# Patient Record
Sex: FEMALE | Race: WHITE | NOT HISPANIC OR LATINO | URBAN - METROPOLITAN AREA
[De-identification: names, ages, dates, MRNs, and addresses within clinical notes are randomized per-mention and may not be internally consistent; named-entity substitution may affect disease eponyms.]

---

## 2022-10-12 ENCOUNTER — OFFICE VISIT (OUTPATIENT)
Dept: URBAN - METROPOLITAN AREA CLINIC 96 | Facility: CLINIC | Age: 67
End: 2022-10-12

## 2022-10-12 NOTE — HPI-TODAY'S VISIT:
67-year-old female previously remotely seen in clinic 2/6/2015 for screening colonoscopy which was performed 2/12/2015.  Examination unremarkable to the terminal ileum aside from internal hemorrhoids.  Repeat colonoscopy recommended in 10 years for screening if no signs, symptoms of concern, or change in family history with regards to risk factors.  Repeat colonoscopy therefore was recommended 2025. Patient now presents for

## 2022-11-16 ENCOUNTER — DASHBOARD ENCOUNTERS (OUTPATIENT)
Age: 67
End: 2022-11-16

## 2022-11-16 ENCOUNTER — OFFICE VISIT (OUTPATIENT)
Dept: URBAN - METROPOLITAN AREA CLINIC 96 | Facility: CLINIC | Age: 67
End: 2022-11-16
Payer: MEDICARE

## 2022-11-16 ENCOUNTER — WEB ENCOUNTER (OUTPATIENT)
Dept: URBAN - METROPOLITAN AREA CLINIC 96 | Facility: CLINIC | Age: 67
End: 2022-11-16

## 2022-11-16 VITALS
OXYGEN SATURATION: 95 % | DIASTOLIC BLOOD PRESSURE: 96 MMHG | HEIGHT: 68 IN | SYSTOLIC BLOOD PRESSURE: 163 MMHG | BODY MASS INDEX: 27.43 KG/M2 | WEIGHT: 181 LBS | HEART RATE: 87 BPM | TEMPERATURE: 98.9 F

## 2022-11-16 DIAGNOSIS — K62.5 RECTAL BLEEDING: ICD-10-CM

## 2022-11-16 DIAGNOSIS — Z85.3 PERSONAL HISTORY OF BREAST CANCER: ICD-10-CM

## 2022-11-16 DIAGNOSIS — R19.4 CHANGE IN BOWEL HABITS: ICD-10-CM

## 2022-11-16 DIAGNOSIS — Z80.0 FAMILY HISTORY OF COLON CANCER: ICD-10-CM

## 2022-11-16 DIAGNOSIS — K59.00 CONSTIPATION, UNSPECIFIED CONSTIPATION TYPE: ICD-10-CM

## 2022-11-16 PROBLEM — 14760008: Status: ACTIVE | Noted: 2022-11-16

## 2022-11-16 PROCEDURE — 99204 OFFICE O/P NEW MOD 45 MIN: CPT | Performed by: INTERNAL MEDICINE

## 2022-11-16 RX ORDER — GABAPENTIN 100 MG/1
1 CAPSULE CAPSULE ORAL ONCE A DAY
Status: ACTIVE | COMMUNITY

## 2022-11-16 RX ORDER — DICLOFENAC POTASSIUM 50 MG/1
1 TABLET WITH FOOD OR MILK AS NEEDED TABLET, FILM COATED ORAL TWICE A DAY
Status: ACTIVE | COMMUNITY

## 2022-11-16 RX ORDER — POLYETHYLENE GLYCOL 3350, SODIUM CHLORIDE, SODIUM BICARBONATE, POTASSIUM CHLORIDE 420; 11.2; 5.72; 1.48 G/4L; G/4L; G/4L; G/4L
AS DIRECTED POWDER, FOR SOLUTION ORAL
OUTPATIENT
Start: 2022-11-16

## 2022-11-16 NOTE — HPI-TODAY'S VISIT:
67-year-old female previously remotely seen for screening colonoscopy performed 2/12/2015.  Unremarkable to the terminal ileum aside from internal hemorrhoids documented on retroflexion.  Repeat colonoscopy for screening purposes recommended in 10 years if no signs, symptoms of concern, change in family history with regards to risk factors.  At her prior 2015 visit, she reported vague history of "possible polyp".  Patient now reports constipation. Patient reports has been having issues at the anal area with "blockage sensation". Ongoing for the past year. No rectal pain. Thinner stools and "balls". Has tried Metamucil, no prior Miralax.   Rectal bleeding intermittent for 6 months, BRBPR with no rectal pain. No unintentional weight loss.   UTD with primary MD. Breast cancer 2020, surgery with no additional therapy required.  BM every 3 days as per baseline.

## 2022-11-16 NOTE — PHYSICAL EXAM HENT:
Head, normocephalic, atraumatic, Face, Face within normal limits, Ears, External ears within normal limits, Neck anterior scar

## 2022-12-13 PROBLEM — 312824007: Status: ACTIVE | Noted: 2022-12-13

## 2022-12-13 PROBLEM — 12063002: Status: ACTIVE | Noted: 2022-12-13

## 2022-12-13 PROBLEM — 129851009: Status: ACTIVE | Noted: 2022-12-13

## 2022-12-13 PROBLEM — 429087003: Status: ACTIVE | Noted: 2022-11-16

## 2023-01-09 ENCOUNTER — TELEPHONE ENCOUNTER (OUTPATIENT)
Dept: URBAN - METROPOLITAN AREA CLINIC 96 | Facility: CLINIC | Age: 68
End: 2023-01-09

## 2023-01-13 ENCOUNTER — CLAIMS CREATED FROM THE CLAIM WINDOW (OUTPATIENT)
Dept: URBAN - METROPOLITAN AREA SURGERY CENTER 18 | Facility: SURGERY CENTER | Age: 68
End: 2023-01-13

## 2023-01-13 ENCOUNTER — CLAIMS CREATED FROM THE CLAIM WINDOW (OUTPATIENT)
Dept: URBAN - METROPOLITAN AREA SURGERY CENTER 18 | Facility: SURGERY CENTER | Age: 68
End: 2023-01-13
Payer: MEDICARE

## 2023-01-13 ENCOUNTER — CLAIMS CREATED FROM THE CLAIM WINDOW (OUTPATIENT)
Dept: URBAN - METROPOLITAN AREA CLINIC 4 | Facility: CLINIC | Age: 68
End: 2023-01-13
Payer: MEDICARE

## 2023-01-13 DIAGNOSIS — K59.09 CHANGE IN BOWEL MOVEMENTS INTERMITTENT CONSTIPATION. URGENCY IN THE MORNING.: ICD-10-CM

## 2023-01-13 DIAGNOSIS — D12.2 ADENOMA OF ASCENDING COLON: ICD-10-CM

## 2023-01-13 DIAGNOSIS — K62.5 ANAL BLEEDING: ICD-10-CM

## 2023-01-13 DIAGNOSIS — K63.5 BENIGN COLON POLYP: ICD-10-CM

## 2023-01-13 DIAGNOSIS — D12.2 BENIGN NEOPLASM OF ASCENDING COLON: ICD-10-CM

## 2023-01-13 PROCEDURE — 88305 TISSUE EXAM BY PATHOLOGIST: CPT | Performed by: PATHOLOGY

## 2023-01-13 PROCEDURE — G8907 PT DOC NO EVENTS ON DISCHARG: HCPCS | Performed by: INTERNAL MEDICINE

## 2023-01-13 PROCEDURE — 45380 COLONOSCOPY AND BIOPSY: CPT | Performed by: INTERNAL MEDICINE

## 2023-01-13 PROCEDURE — 45385 COLONOSCOPY W/LESION REMOVAL: CPT | Performed by: INTERNAL MEDICINE

## 2024-08-07 ENCOUNTER — OFFICE VISIT (OUTPATIENT)
Dept: URBAN - METROPOLITAN AREA CLINIC 96 | Facility: CLINIC | Age: 69
End: 2024-08-07

## 2024-08-07 PROBLEM — 428283002: Status: ACTIVE | Noted: 2024-08-07

## 2024-08-07 RX ORDER — DICLOFENAC POTASSIUM 50 MG/1
1 TABLET WITH FOOD OR MILK AS NEEDED TABLET, FILM COATED ORAL TWICE A DAY
COMMUNITY

## 2024-08-07 RX ORDER — GABAPENTIN 100 MG/1
1 CAPSULE CAPSULE ORAL ONCE A DAY
COMMUNITY

## 2024-08-07 NOTE — HPI-TODAY'S VISIT:
68-year-old female previously  seen 11/16/2022. As noted prior, screening colonoscopy performed 2/12/2015.  Unremarkable to the terminal ileum aside from internal hemorrhoids documented on retroflexion.  Repeat colonoscopy for screening purposes recommended in 10 years if no signs, symptoms of concern, change in family history with regards to risk factors.  At her prior 2015 visit, she reported vague history of "possible polyp".  Patient previously reported constipation with issues at the anal area with "blockage sensation". No rectal pain. Thinner stools and "balls". Has tried Metamucil, no prior Miralax. Rectal bleeding intermittently, BRBPR with no rectal pain. No unintentional weight loss.   UTD with primary MD. Breast cancer 2020, surgery with no additional therapy required. BM every 3 days as per baseline.  Colonoscopy performed 1/13/2023 with few small mouth diverticula sigmoid colon.  10 mm polyp in the transverse colon, 2 polyps in the ascending colon measuring 4 mm.  Pathology with ascending colon polyp consistent with tubular adenoma.  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  Repeat colonoscopy for polyp surveillance was recommended in 3 years (2026).

## 2024-08-09 ENCOUNTER — TELEPHONE ENCOUNTER (OUTPATIENT)
Dept: URBAN - METROPOLITAN AREA CLINIC 96 | Facility: CLINIC | Age: 69
End: 2024-08-09

## 2024-08-09 ENCOUNTER — OFFICE VISIT (OUTPATIENT)
Dept: URBAN - METROPOLITAN AREA CLINIC 96 | Facility: CLINIC | Age: 69
End: 2024-08-09
Payer: MEDICARE

## 2024-08-09 VITALS
SYSTOLIC BLOOD PRESSURE: 151 MMHG | HEIGHT: 68 IN | TEMPERATURE: 97.7 F | WEIGHT: 183 LBS | BODY MASS INDEX: 27.74 KG/M2 | DIASTOLIC BLOOD PRESSURE: 93 MMHG | HEART RATE: 72 BPM

## 2024-08-09 DIAGNOSIS — K62.5 RECTAL BLEEDING: ICD-10-CM

## 2024-08-09 DIAGNOSIS — Z86.010 PERSONAL HISTORY OF COLONIC POLYPS: ICD-10-CM

## 2024-08-09 DIAGNOSIS — K59.04 CHRONIC IDIOPATHIC CONSTIPATION: ICD-10-CM

## 2024-08-09 DIAGNOSIS — K64.8 OTHER HEMORRHOIDS: ICD-10-CM

## 2024-08-09 PROBLEM — 82934008: Status: ACTIVE | Noted: 2024-08-09

## 2024-08-09 PROCEDURE — 99204 OFFICE O/P NEW MOD 45 MIN: CPT | Performed by: INTERNAL MEDICINE

## 2024-08-09 RX ORDER — DICLOFENAC POTASSIUM 50 MG/1
1 TABLET WITH FOOD OR MILK AS NEEDED TABLET, FILM COATED ORAL TWICE A DAY
Status: ACTIVE | COMMUNITY

## 2024-08-09 RX ORDER — GABAPENTIN 100 MG/1
1 CAPSULE CAPSULE ORAL ONCE A DAY
Status: ACTIVE | COMMUNITY

## 2024-08-09 NOTE — HPI-TODAY'S VISIT:
This is a 69-year-old female patient of Dr. Archer's.  Patient last saw her in the office for constipation in 2022.  She had a colonoscopy in 2015.  She did have history of hemorrhoids seen on that exam.  She had complained of hard stools and was using Metamucil and MiraLAX.  She also complained of rectal bleeding.  Dr. Archer ordered a colonoscopy.  The exam was done on January 13, 2023 and this revealed to small polyps in the ascending colon that were removed as well as diverticula in the sigmoid and nonbleeding internal hemorrhoids.  A Hemoclip was placed at the site of 1 polypectomy.  Pathology showed inflammatory pseudopolyp of the transverse colon and tubular adenomas of the ascending colon.  It was recommended that she have a colonoscopy follow-up in 3 years or January 2026.  Patient stated that Dr. Archer was booked so she made the appointment to see me today because of rectal bleeding again.

## 2024-09-09 ENCOUNTER — TELEPHONE ENCOUNTER (OUTPATIENT)
Dept: URBAN - METROPOLITAN AREA CLINIC 96 | Facility: CLINIC | Age: 69
End: 2024-09-09

## 2024-09-10 ENCOUNTER — LAB OUTSIDE AN ENCOUNTER (OUTPATIENT)
Dept: URBAN - METROPOLITAN AREA CLINIC 96 | Facility: CLINIC | Age: 69
End: 2024-09-10

## 2024-09-10 ENCOUNTER — OFFICE VISIT (OUTPATIENT)
Dept: URBAN - METROPOLITAN AREA CLINIC 96 | Facility: CLINIC | Age: 69
End: 2024-09-10
Payer: MEDICARE

## 2024-09-10 VITALS
WEIGHT: 179 LBS | DIASTOLIC BLOOD PRESSURE: 97 MMHG | TEMPERATURE: 98.2 F | HEIGHT: 68 IN | SYSTOLIC BLOOD PRESSURE: 146 MMHG | HEART RATE: 80 BPM | BODY MASS INDEX: 27.13 KG/M2

## 2024-09-10 DIAGNOSIS — R10.11 RUQ PAIN: ICD-10-CM

## 2024-09-10 DIAGNOSIS — R11.0 NAUSEA: ICD-10-CM

## 2024-09-10 DIAGNOSIS — K80.20 CALCULUS OF GALLBLADDER WITHOUT CHOLECYSTITIS WITHOUT OBSTRUCTION: ICD-10-CM

## 2024-09-10 DIAGNOSIS — K76.0 HEPATIC STEATOSIS: ICD-10-CM

## 2024-09-10 PROBLEM — 197321007: Status: ACTIVE | Noted: 2024-09-10

## 2024-09-10 PROBLEM — 70342003: Status: ACTIVE | Noted: 2024-09-10

## 2024-09-10 PROCEDURE — 99214 OFFICE O/P EST MOD 30 MIN: CPT | Performed by: INTERNAL MEDICINE

## 2024-09-10 RX ORDER — DICLOFENAC POTASSIUM 50 MG/1
1 TABLET WITH FOOD OR MILK AS NEEDED TABLET, FILM COATED ORAL TWICE A DAY
Status: ACTIVE | COMMUNITY

## 2024-09-10 RX ORDER — GABAPENTIN 100 MG/1
1 CAPSULE CAPSULE ORAL ONCE A DAY
Status: ACTIVE | COMMUNITY

## 2024-09-10 NOTE — PHYSICAL EXAM CHEST:
chest wall non-tender, breathing is unlabored without accessory muscle use, normal breath sounds, scars,

## 2024-09-10 NOTE — HPI-TODAY'S VISIT:
69-year-old female previously  seen 11/16/2022. As noted prior, screening colonoscopy performed 2/12/2015.  Unremarkable to the terminal ileum aside from internal hemorrhoids documented on retroflexion.  Repeat colonoscopy for screening purposes recommended in 10 years if no signs, symptoms of concern, change in family history with regards to risk factors.  At her prior 2015 visit, she reported vague history of "possible polyp".  Patient previously reported constipation with issues at the anal area with "blockage sensation". No rectal pain. Thinner stools and "balls". Has tried Metamucil, no prior Miralax. Rectal bleeding intermittently, BRBPR with no rectal pain. No unintentional weight loss. UTD with primary MD. Breast cancer 2020, surgery with no additional therapy required. BM every 3 days as per baseline.  Colonoscopy performed 1/13/2023 with few small mouth diverticula sigmoid colon.  10 mm polyp in the transverse colon, 2 polyps in the ascending colon measuring 4 mm.  Pathology with ascending colon polyp consistent with tubular adenoma.  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  Repeat colonoscopy for polyp surveillance was recommended in 3 years (2026).  Patient was then more recently seen by Dr. Brown 8/9/2024.  Again, reported having some rectal bleeding.  Patient was referred for hemorrhoidal banding. Was told to try fiber gummies which has helped, wants to hold off on hemorrhoidal banding given improvement.   Recent CT 8/8/2024 ordered by primary MD of abdomen and pelvis with contrast with cholelithiasis, mild decreased attenuation present "correlate for steatosis". She now presents for discussion of such.  She report RUQ pain, intermittent with nausea with no emesis. Rare alcohol. No family history of liver disease. Does not exercise regulalry. Routine labs normal per patient in 8/2024. She showed me the labs on her phone with nornal LFT.